# Patient Record
Sex: FEMALE | Race: BLACK OR AFRICAN AMERICAN | NOT HISPANIC OR LATINO
[De-identification: names, ages, dates, MRNs, and addresses within clinical notes are randomized per-mention and may not be internally consistent; named-entity substitution may affect disease eponyms.]

---

## 2019-11-27 PROBLEM — Z00.00 ENCOUNTER FOR PREVENTIVE HEALTH EXAMINATION: Status: ACTIVE | Noted: 2019-11-27

## 2019-11-29 ENCOUNTER — APPOINTMENT (OUTPATIENT)
Dept: PAIN MANAGEMENT | Facility: CLINIC | Age: 44
End: 2019-11-29
Payer: COMMERCIAL

## 2019-11-29 VITALS
WEIGHT: 158 LBS | BODY MASS INDEX: 26.33 KG/M2 | HEIGHT: 65 IN | DIASTOLIC BLOOD PRESSURE: 100 MMHG | SYSTOLIC BLOOD PRESSURE: 150 MMHG

## 2019-11-29 DIAGNOSIS — Z78.9 OTHER SPECIFIED HEALTH STATUS: ICD-10-CM

## 2019-11-29 PROCEDURE — 99204 OFFICE O/P NEW MOD 45 MIN: CPT

## 2019-11-29 NOTE — PHYSICAL EXAM
[de-identified] : Constitutional: Well-developed, in no acute distress\par Eyes: Pupil- Right: normal, Left: normal\par Neck exam: Inspection normal\par Respiratory: Normal effort, speaking in full sentences\par Cardiovascular: Regular rate and rhythm\par Vascular: Dorsal pedis pulses normal and equal bilaterally\par Abdomen: Inspection normal, no distension\par Skin: Inspection normal, no bruising noted\par Musculoskeletal:\par Lumbar Spine:   Gait: Antalgic\par 		Inspection: Normal curvature, no abnormal kyphosis or scoliosis\par 		Facet loading: pain bilaterally\par 		Palpation:\par 			Lumbar and paraspinal muscles: pain bilaterally\par 			Sacroiliac joint: no pain\par 			Greater trochanter: no pain\par 		Muscle Strength:\par 		Iliopsoas: 5/5 bilaterally\par 		Quadriceps: 5/5 bilaterally\par 		Hamstrings: 5/5 bilaterally\par 		Tibialis anterior: 5/5 bilaterally\par 		Extensor hallucis longus: 5/5 bilaterally\par \par 		Sensation: normal and equal in bilateral lower extremities\par \par 		Reflexes:\par 			Patellar reflex: 2+ bilaterally\par 			Ankle jerk reflex: 2+ bilaterally\par 		\par 		Straight leg raise: negative bilaterally\par \par Extremity: no edema noted\par Neurological: Memory normal, AAO x 3, Cranial nerves II - XII grossly normal\par Psychiatric: Appropriate mood and affect, oriented to time, place, person, and situation\par \par \par

## 2019-11-29 NOTE — HISTORY OF PRESENT ILLNESS
[Back Pain] : back pain [___ wks] : [unfilled] week(s) ago [7] : a maximum pain level of 7/10 [Sharp] : sharp [Aching] : aching [Throbbing] : throbbing [Shooting] : shooting [Bending] : bending [Lifting] : lifting [FreeTextEntry1] : 44 yof presents w/ low back pain radiating down her left lower extremity. Pain is severe and 10 out of 10 in intensity at its worst. She has had a history of similar pain on the right side but never on the left. Pain radiates down the posterior aspect of the leg. Pain is sharp and shooting. Flexing makes the pain worse. Quality of life is significantly impaired. No other recent changes in health. [FreeTextEntry3] : squatting  [FreeTextEntry4] : n/a

## 2019-11-29 NOTE — ASSESSMENT
[FreeTextEntry1] : 44 yof presents w/ low back pain radiating down her left lower extremity. Quality of life is significantly impaired. Given her failure to improve with all other conservative measures, and the severity of her radiculopathy, recommend MRI of the lumbar spine. Patient will return to review imaging and plan for potential intervention, likely epidural steroid injection. Continue HEP. NSAIDS prn.

## 2019-12-09 ENCOUNTER — RESULT REVIEW (OUTPATIENT)
Age: 44
End: 2019-12-09

## 2020-01-21 ENCOUNTER — APPOINTMENT (OUTPATIENT)
Dept: INTERNAL MEDICINE | Facility: CLINIC | Age: 45
End: 2020-01-21
Payer: COMMERCIAL

## 2020-01-21 VITALS
TEMPERATURE: 97.4 F | HEIGHT: 65 IN | SYSTOLIC BLOOD PRESSURE: 140 MMHG | BODY MASS INDEX: 26.99 KG/M2 | OXYGEN SATURATION: 100 % | HEART RATE: 72 BPM | WEIGHT: 162 LBS | DIASTOLIC BLOOD PRESSURE: 80 MMHG | RESPIRATION RATE: 16 BRPM

## 2020-01-21 VITALS — SYSTOLIC BLOOD PRESSURE: 160 MMHG | DIASTOLIC BLOOD PRESSURE: 86 MMHG

## 2020-01-21 DIAGNOSIS — Z87.39 PERSONAL HISTORY OF OTHER DISEASES OF THE MUSCULOSKELETAL SYSTEM AND CONNECTIVE TISSUE: ICD-10-CM

## 2020-01-21 DIAGNOSIS — M47.817 SPONDYLOSIS W/OUT MYELOPATHY OR RADICULOPATHY, LUMBOSACRAL REGION: ICD-10-CM

## 2020-01-21 DIAGNOSIS — Z80.3 FAMILY HISTORY OF MALIGNANT NEOPLASM OF BREAST: ICD-10-CM

## 2020-01-21 PROCEDURE — 99204 OFFICE O/P NEW MOD 45 MIN: CPT

## 2020-01-21 NOTE — HISTORY OF PRESENT ILLNESS
[FreeTextEntry8] : here to establish care, former px of [facility], \par off Amlodipine x few months after taking it for years and c/o occasional face/lip swelling\par ++ HA  x 1 week , constant, no  visual changes

## 2020-01-21 NOTE — REVIEW OF SYSTEMS
[Negative] : Heme/Lymph [Dizziness] : no dizziness [Headache] : headache [Memory Loss] : no memory loss [de-identified] : see HPI

## 2020-02-06 ENCOUNTER — APPOINTMENT (OUTPATIENT)
Dept: INTERNAL MEDICINE | Facility: CLINIC | Age: 45
End: 2020-02-06
Payer: COMMERCIAL

## 2020-02-06 VITALS
SYSTOLIC BLOOD PRESSURE: 130 MMHG | BODY MASS INDEX: 27.16 KG/M2 | TEMPERATURE: 98.8 F | WEIGHT: 163 LBS | RESPIRATION RATE: 16 BRPM | DIASTOLIC BLOOD PRESSURE: 82 MMHG | HEIGHT: 65 IN

## 2020-02-06 VITALS — SYSTOLIC BLOOD PRESSURE: 132 MMHG | HEART RATE: 60 BPM | DIASTOLIC BLOOD PRESSURE: 70 MMHG

## 2020-02-06 PROCEDURE — 99213 OFFICE O/P EST LOW 20 MIN: CPT

## 2020-04-26 ENCOUNTER — MESSAGE (OUTPATIENT)
Age: 45
End: 2020-04-26

## 2020-05-03 LAB
SARS-COV-2 IGG SERPL IA-ACNC: <0.1 INDEX
SARS-COV-2 IGG SERPL QL IA: NEGATIVE

## 2020-07-20 ENCOUNTER — APPOINTMENT (OUTPATIENT)
Dept: INTERNAL MEDICINE | Facility: CLINIC | Age: 45
End: 2020-07-20
Payer: COMMERCIAL

## 2020-07-20 ENCOUNTER — NON-APPOINTMENT (OUTPATIENT)
Age: 45
End: 2020-07-20

## 2020-07-20 VITALS
SYSTOLIC BLOOD PRESSURE: 128 MMHG | HEART RATE: 64 BPM | DIASTOLIC BLOOD PRESSURE: 80 MMHG | HEIGHT: 65 IN | BODY MASS INDEX: 28.49 KG/M2 | TEMPERATURE: 98.3 F | WEIGHT: 171 LBS

## 2020-07-20 DIAGNOSIS — Z76.89 PERSONS ENCOUNTERING HEALTH SERVICES IN OTHER SPECIFIED CIRCUMSTANCES: ICD-10-CM

## 2020-07-20 PROCEDURE — 36415 COLL VENOUS BLD VENIPUNCTURE: CPT

## 2020-07-20 PROCEDURE — 93000 ELECTROCARDIOGRAM COMPLETE: CPT

## 2020-07-20 PROCEDURE — 99396 PREV VISIT EST AGE 40-64: CPT | Mod: 25

## 2020-07-20 NOTE — HEALTH RISK ASSESSMENT
[Good] : ~his/her~ current health as good [Yes] : Yes [0] : 1) Little interest or pleasure doing things: Not at all (0) [Patient reported PAP Smear was normal] : Patient reported PAP Smear was normal [Patient reported mammogram was normal] : Patient reported mammogram was normal [HIV test declined] : HIV test declined [With Family] : lives with family [Employed] : employed [High School] : high school [] :  [# Of Children ___] : has [unfilled] children [With Patient/Caregiver] : With Patient/Caregiver [Name: ___] : Health Care Proxy's Name: [unfilled]  [Designated Healthcare Proxy] : Designated healthcare proxy [Relationship: ___] : Relationship: [unfilled] [] : No [[id number]] : 0 [MammogramDate] : 01/19 [PapSmearDate] : 01/19 [AdvancecareDate] : 07/20/20

## 2020-07-20 NOTE — REVIEW OF SYSTEMS
[Negative] : Neurological [Discharge] : no discharge [Vision Problems] : no vision problems [FreeTextEntry3] : severe miopia

## 2020-07-20 NOTE — PHYSICAL EXAM
[Normal] : normal gait, coordination grossly intact, no focal deficits [de-identified] : bilat dense  no lumps [de-identified] : enlarged thyroid [de-identified] : no rashes

## 2020-07-21 LAB
25(OH)D3 SERPL-MCNC: 32 NG/ML
ALBUMIN SERPL ELPH-MCNC: 4.6 G/DL
ALP BLD-CCNC: 63 U/L
ALT SERPL-CCNC: 11 U/L
ANION GAP SERPL CALC-SCNC: 14 MMOL/L
AST SERPL-CCNC: 18 U/L
BASOPHILS # BLD AUTO: 0.03 K/UL
BASOPHILS NFR BLD AUTO: 0.7 %
BILIRUB SERPL-MCNC: 0.2 MG/DL
BUN SERPL-MCNC: 15 MG/DL
CALCIUM SERPL-MCNC: 9.5 MG/DL
CHLORIDE SERPL-SCNC: 104 MMOL/L
CHOLEST SERPL-MCNC: 185 MG/DL
CHOLEST/HDLC SERPL: 2.6 RATIO
CO2 SERPL-SCNC: 26 MMOL/L
CREAT SERPL-MCNC: 1 MG/DL
EOSINOPHIL # BLD AUTO: 0.06 K/UL
EOSINOPHIL NFR BLD AUTO: 1.4 %
GLUCOSE SERPL-MCNC: 87 MG/DL
HBV SURFACE AB SER QL: REACTIVE
HCT VFR BLD CALC: 40.3 %
HDLC SERPL-MCNC: 72 MG/DL
HGB BLD-MCNC: 12.4 G/DL
IMM GRANULOCYTES NFR BLD AUTO: 0.2 %
LDLC SERPL CALC-MCNC: 99 MG/DL
LYMPHOCYTES # BLD AUTO: 1.92 K/UL
LYMPHOCYTES NFR BLD AUTO: 45.4 %
MAN DIFF?: NORMAL
MCHC RBC-ENTMCNC: 26.5 PG
MCHC RBC-ENTMCNC: 30.8 GM/DL
MCV RBC AUTO: 86.1 FL
MONOCYTES # BLD AUTO: 0.44 K/UL
MONOCYTES NFR BLD AUTO: 10.4 %
NEUTROPHILS # BLD AUTO: 1.77 K/UL
NEUTROPHILS NFR BLD AUTO: 41.9 %
PLATELET # BLD AUTO: 278 K/UL
POTASSIUM SERPL-SCNC: 4.5 MMOL/L
PROT SERPL-MCNC: 8.2 G/DL
RBC # BLD: 4.68 M/UL
RBC # FLD: 17 %
SODIUM SERPL-SCNC: 144 MMOL/L
T4 FREE SERPL-MCNC: 1.1 NG/DL
TRIGL SERPL-MCNC: 68 MG/DL
TSH SERPL-ACNC: 0.69 UIU/ML
WBC # FLD AUTO: 4.23 K/UL

## 2020-11-13 ENCOUNTER — RX RENEWAL (OUTPATIENT)
Age: 45
End: 2020-11-13

## 2020-12-01 ENCOUNTER — RESULT REVIEW (OUTPATIENT)
Age: 45
End: 2020-12-01

## 2020-12-07 ENCOUNTER — APPOINTMENT (OUTPATIENT)
Dept: INTERNAL MEDICINE | Facility: CLINIC | Age: 45
End: 2020-12-07
Payer: COMMERCIAL

## 2020-12-07 VITALS
HEART RATE: 67 BPM | DIASTOLIC BLOOD PRESSURE: 110 MMHG | WEIGHT: 171 LBS | TEMPERATURE: 97.9 F | HEIGHT: 65 IN | BODY MASS INDEX: 28.49 KG/M2 | OXYGEN SATURATION: 99 % | SYSTOLIC BLOOD PRESSURE: 174 MMHG

## 2020-12-07 VITALS — DIASTOLIC BLOOD PRESSURE: 100 MMHG | SYSTOLIC BLOOD PRESSURE: 172 MMHG

## 2020-12-07 PROCEDURE — 99072 ADDL SUPL MATRL&STAF TM PHE: CPT

## 2020-12-07 PROCEDURE — 99213 OFFICE O/P EST LOW 20 MIN: CPT

## 2020-12-07 NOTE — HISTORY OF PRESENT ILLNESS
[FreeTextEntry8] : here for medical eval, c/o few days  HA and CP , occasional epistaxis\par compliant to BP meds, ?? diet

## 2020-12-07 NOTE — REVIEW OF SYSTEMS
[Fever] : no fever [Chills] : no chills [Chest Pain] : chest pain [Palpitations] : no palpitations [Orthopnea] : no orthopnea [Dysuria] : no dysuria [Nocturia] : nocturia [Hematuria] : no hematuria [Negative] : Neurological

## 2020-12-08 ENCOUNTER — NON-APPOINTMENT (OUTPATIENT)
Age: 45
End: 2020-12-08

## 2020-12-10 ENCOUNTER — APPOINTMENT (OUTPATIENT)
Dept: INTERNAL MEDICINE | Facility: CLINIC | Age: 45
End: 2020-12-10
Payer: COMMERCIAL

## 2020-12-10 VITALS
TEMPERATURE: 97.4 F | HEART RATE: 84 BPM | WEIGHT: 171 LBS | BODY MASS INDEX: 28.49 KG/M2 | OXYGEN SATURATION: 95 % | SYSTOLIC BLOOD PRESSURE: 132 MMHG | DIASTOLIC BLOOD PRESSURE: 80 MMHG | HEIGHT: 65 IN

## 2020-12-10 PROCEDURE — 99072 ADDL SUPL MATRL&STAF TM PHE: CPT

## 2020-12-10 PROCEDURE — 99213 OFFICE O/P EST LOW 20 MIN: CPT

## 2020-12-10 NOTE — REVIEW OF SYSTEMS
[Fever] : no fever [Chills] : no chills [Fatigue] : no fatigue [Earache] : no earache [Nasal Discharge] : no nasal discharge [Postnasal Drip] : no postnasal drip [Shortness Of Breath] : shortness of breath [Wheezing] : no wheezing [Cough] : no cough [Dyspnea on Exertion] : dyspnea on exertion [Negative] : Neurological [FreeTextEntry4] : congestion and sinus pressure

## 2020-12-10 NOTE — HISTORY OF PRESENT ILLNESS
[de-identified] : c/o few days dyspnea , worse c exercise was exposed to  Covid + and asymptomatic (she was cleared for work when reported), no fever + congestion \par  BP better after adding diuretic

## 2020-12-10 NOTE — PHYSICAL EXAM
[Normal TMs] : both tympanic membranes were normal [Normal] : normal gait, coordination grossly intact, no focal deficits and deep tendon reflexes were 2+ and symmetric [de-identified] : some frontal sinus tenderness

## 2020-12-10 NOTE — PHYSICAL EXAM
[Normal TMs] : both tympanic membranes were normal [Normal] : normal gait, coordination grossly intact, no focal deficits and deep tendon reflexes were 2+ and symmetric [de-identified] : some frontal sinus tenderness

## 2020-12-10 NOTE — HISTORY OF PRESENT ILLNESS
[de-identified] : c/o few days dyspnea , worse c exercise was exposed to  Covid + and asymptomatic (she was cleared for work when reported), no fever + congestion \par  BP better after adding diuretic

## 2020-12-11 ENCOUNTER — RESULT REVIEW (OUTPATIENT)
Age: 45
End: 2020-12-11

## 2020-12-15 ENCOUNTER — RESULT REVIEW (OUTPATIENT)
Age: 45
End: 2020-12-15

## 2020-12-18 ENCOUNTER — RESULT REVIEW (OUTPATIENT)
Age: 45
End: 2020-12-18

## 2021-01-12 ENCOUNTER — APPOINTMENT (OUTPATIENT)
Dept: PAIN MANAGEMENT | Facility: CLINIC | Age: 46
End: 2021-01-12
Payer: COMMERCIAL

## 2021-01-12 VITALS
HEIGHT: 65 IN | WEIGHT: 170 LBS | DIASTOLIC BLOOD PRESSURE: 70 MMHG | BODY MASS INDEX: 28.32 KG/M2 | TEMPERATURE: 98 F | SYSTOLIC BLOOD PRESSURE: 130 MMHG

## 2021-01-12 PROCEDURE — 99214 OFFICE O/P EST MOD 30 MIN: CPT

## 2021-01-12 PROCEDURE — 99072 ADDL SUPL MATRL&STAF TM PHE: CPT

## 2021-01-12 NOTE — ASSESSMENT
[FreeTextEntry1] : 45 yof presents w/ low back pain radiating down her left lower extremity which is worsening. I have personally reviewed the patient's MRI in detail and discussed it with them which is significant for a disc herniation at L4-L5 and moderate to severe foraminal narrowing on the left at L5-S1. The patient has failed to have relief with medication management and physical therapy. Given the patients failure to improve with all other conservative measures, recommend L5-S1 interlaminar epidural steroid injection under fluoroscopic guidance. The patient will follow-up with me in my office two weeks following intervention. Start tizanidine 4 mg qhs prn. Patient advised that the administration of steroids will likely raise the blood pressure transiently, and to monitor this closely following any interventional procedure that involves steroids, given history of hypertension.\par \par

## 2021-01-12 NOTE — PHYSICAL EXAM
[de-identified] : Constitutional: Well-developed, in no acute distress\par Eyes: Pupil- Right: normal, Left: normal\par Neck exam: Inspection normal\par Respiratory: Normal effort, speaking in full sentences\par Cardiovascular: Regular rate and rhythm\par Vascular: Dorsal pedis pulses normal and equal bilaterally\par Abdomen: Inspection normal, no distension\par Skin: Inspection normal, no bruising noted\par Musculoskeletal:\par Lumbar Spine:   Gait: Antalgic\par 		Inspection: Normal curvature, no abnormal kyphosis or scoliosis\par 		Facet loading: pain bilaterally\par 		Palpation:\par 			Lumbar and paraspinal muscles: pain bilaterally\par 			Sacroiliac joint: no pain\par 			Greater trochanter: no pain\par 		Muscle Strength:\par 		Iliopsoas: 5/5 bilaterally\par 		Quadriceps: 5/5 bilaterally\par 		Hamstrings: 5/5 bilaterally\par 		Tibialis anterior: 5/5 bilaterally\par 		Extensor hallucis longus: 5/5 bilaterally\par \par 		Sensation: normal and equal in bilateral lower extremities\par \par 		Reflexes:\par 			Patellar reflex: 2+ bilaterally\par 			Ankle jerk reflex: 2+ bilaterally\par 		\par 		Straight leg raise: negative bilaterally\par \par Extremity: no edema noted\par Neurological: Memory normal, AAO x 3, Cranial nerves II - XII grossly normal\par Psychiatric: Appropriate mood and affect, oriented to time, place, person, and situation\par \par \par

## 2021-01-12 NOTE — DATA REVIEWED
[No studies available for review at this time.] : No studies available for review at this time. [FreeTextEntry1] :  12/09/19\par  MRI LUMBAR SPINE\par \par  There is minimal retrolisthesis of L5 on S1 (approximately 2 to 3 mm). Otherwise there is normal\par curvature to the lumbar lordosis. The vertebral bodies are normal height and configuration. There is\par mild loss of disc height and T2 signal within the L5/S1 disc space consistent with desiccation. The\par intervertebral disc spaces are within normal limits. The conus terminates at the L1 level and\par demonstrates no evidence of abnormal signal changes. The spinal canal is capacious.\par \par  Evaluation of the individual levels demonstrates at the L5/S1 level there is minimal retrolisthesis\par with a superimposed  mild diffuse disc bulge and a tiny posterior annular tear contacting the\par ventral thecal sac. There is moderate bilateral foraminal narrowing, left greater than right. There\par is moderate facet and ligamentous hypertrophy. There is a tiny amount of fluid seen in the facet\par joints. There is no evidence of spinal canal stenosis.\par \par  At the L4/L5 level there is a shallow left paracentral subarticular and foraminal broad-based disc\par protrusion flattening the left the ventral thecal sac and mildly compressing the left descending L5\par nerve root and in close proximity to the left foraminal L4 exiting nerve root. There is mild to\par moderate facet and ligamentous hypertrophy. There is mild right and moderate left foraminal\par narrowing. There is no evidence of spinal canal stenosis.\par \par  The remaining levels demonstrate no evidence of focal disc herniation or spinal canal stenosis.\par There is a 0.4 cm cyst is seen within the left neural foramen at L1/L2 with bilateral small 0.4 cm\par bilateral perineural cysts within the L2/L3 and 0.3 cm perineural cysts within the L3/L4\par \par  There are a few soft tissue nodules seen within the uterus 1 that is submucosal in location likely\par fibroids. If there is further clinical concern, ultrasound is recommended.\par \par \par  Impression:\par \par  Minimal retrolisthesis of L5 on S1\par \par  L5/S1 mild diffuse disc bulge and a tiny posterior annular tear. L5/S1 no evidence of spinal canal\par stenosis.\par \par  L4/L5  shallow left paracentral subarticular and foraminal broad-based disc protrusion mildly\par compressing the left descending L5 nerve root and in close proximity to the left foraminal L4\par exiting nerve root. L4/L5 no evidence of spinal canal stenosis.\par

## 2021-01-12 NOTE — HISTORY OF PRESENT ILLNESS
[9] : 3. What number best describes how, during the past week, pain has interfered with your general activity? 9/10 pain [Back Pain] : back pain [___ wks] : [unfilled] week(s) ago [7] : a maximum pain level of 7/10 [Sharp] : sharp [Aching] : aching [Throbbing] : throbbing [Shooting] : shooting [Bending] : bending [Lifting] : lifting [FreeTextEntry1] : As per my note 11/2019: "44 yof presents w/ low back pain radiating down her left lower extremity. Pain is severe and 10 out of 10 in intensity at its worst. She has had a history of similar pain on the right side but never on the left. Pain radiates down the posterior aspect of the leg. Pain is sharp and shooting. Flexing makes the pain worse. Quality of life is significantly impaired. No other recent changes in health."\par \par Pt returns for follow-up today, 01/12/21. Her pain is worsening. Pain is down the left leg. Right leg is wnl. There is subjective weakness. No relief w/ multiple courses of physical therapy including 6 weeks of physical therapy within the last three months. \par \par  [FreeTextEntry2] : 25 [FreeTextEntry3] : squatting  [FreeTextEntry4] : n/a

## 2021-03-15 ENCOUNTER — RX RENEWAL (OUTPATIENT)
Age: 46
End: 2021-03-15

## 2021-06-10 ENCOUNTER — APPOINTMENT (OUTPATIENT)
Dept: INTERNAL MEDICINE | Facility: CLINIC | Age: 46
End: 2021-06-10
Payer: COMMERCIAL

## 2021-06-10 ENCOUNTER — NON-APPOINTMENT (OUTPATIENT)
Age: 46
End: 2021-06-10

## 2021-06-10 VITALS
OXYGEN SATURATION: 99 % | HEART RATE: 69 BPM | DIASTOLIC BLOOD PRESSURE: 80 MMHG | WEIGHT: 170 LBS | BODY MASS INDEX: 28.32 KG/M2 | HEIGHT: 65 IN | TEMPERATURE: 97 F | SYSTOLIC BLOOD PRESSURE: 134 MMHG

## 2021-06-10 DIAGNOSIS — Z00.01 ENCOUNTER FOR GENERAL ADULT MEDICAL EXAMINATION WITH ABNORMAL FINDINGS: ICD-10-CM

## 2021-06-10 PROCEDURE — 99214 OFFICE O/P EST MOD 30 MIN: CPT

## 2021-06-10 NOTE — REVIEW OF SYSTEMS
[Muscle Pain] : muscle pain [Negative] : Integumentary [Fever] : no fever [Chills] : no chills [Fatigue] : no fatigue [Earache] : no earache [Nasal Discharge] : no nasal discharge [Postnasal Drip] : no postnasal drip [Shortness Of Breath] : no shortness of breath [Wheezing] : no wheezing [Cough] : no cough [Dyspnea on Exertion] : no dyspnea on exertion [Muscle Weakness] : no muscle weakness [FreeTextEntry9] : neck

## 2021-06-10 NOTE — PHYSICAL EXAM
[Normal] : normal gait, coordination grossly intact, no focal deficits [de-identified] : full ROM , nontender  ,spastic trapezius mm bilat [de-identified] : no rashes
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
## 2021-08-04 ENCOUNTER — RX RENEWAL (OUTPATIENT)
Age: 46
End: 2021-08-04

## 2021-09-13 ENCOUNTER — RESULT REVIEW (OUTPATIENT)
Age: 46
End: 2021-09-13

## 2021-09-20 ENCOUNTER — APPOINTMENT (OUTPATIENT)
Dept: INTERNAL MEDICINE | Facility: CLINIC | Age: 46
End: 2021-09-20
Payer: COMMERCIAL

## 2021-09-20 VITALS
HEIGHT: 65 IN | HEART RATE: 56 BPM | DIASTOLIC BLOOD PRESSURE: 80 MMHG | SYSTOLIC BLOOD PRESSURE: 122 MMHG | TEMPERATURE: 97.4 F | OXYGEN SATURATION: 99 % | BODY MASS INDEX: 28.66 KG/M2 | WEIGHT: 172 LBS

## 2021-09-20 PROCEDURE — 99213 OFFICE O/P EST LOW 20 MIN: CPT

## 2021-09-20 RX ORDER — CHLORHEXIDINE GLUCONATE, 0.12% ORAL RINSE 1.2 MG/ML
0.12 SOLUTION DENTAL
Qty: 473 | Refills: 0 | Status: DISCONTINUED | COMMUNITY
Start: 2021-02-25 | End: 2021-09-20

## 2021-09-20 RX ORDER — CYCLOBENZAPRINE HYDROCHLORIDE 10 MG/1
10 TABLET, FILM COATED ORAL
Qty: 10 | Refills: 0 | Status: DISCONTINUED | COMMUNITY
Start: 2021-06-08 | End: 2021-09-20

## 2021-09-20 NOTE — HEALTH RISK ASSESSMENT
[] : No [No] : No [0] : 2) Feeling down, depressed, or hopeless: Not at all (0) [PHQ-2 Negative - No further assessment needed] : PHQ-2 Negative - No further assessment needed [[id number]] : 0
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
## 2021-09-20 NOTE — REVIEW OF SYSTEMS
[Fever] : no fever [Chills] : no chills [Fatigue] : no fatigue [Earache] : no earache [Nasal Discharge] : no nasal discharge [Postnasal Drip] : no postnasal drip [Shortness Of Breath] : no shortness of breath [Wheezing] : no wheezing [Cough] : no cough [Dyspnea on Exertion] : no dyspnea on exertion [Muscle Weakness] : no muscle weakness [Muscle Pain] : muscle pain [Back Pain] : back pain [Negative] : Neurological [FreeTextEntry9] : neck

## 2021-09-20 NOTE — PHYSICAL EXAM
[Normal TMs] : both tympanic membranes were normal [No Joint Swelling] : no joint swelling [Grossly Normal Strength/Tone] : grossly normal strength/tone [Normal] : normal gait, coordination grossly intact, no focal deficits and deep tendon reflexes were 2+ and symmetric [de-identified] : L SI joint tenderness [de-identified] : neg straight leg raising bilat

## 2021-10-29 ENCOUNTER — RX RENEWAL (OUTPATIENT)
Age: 46
End: 2021-10-29

## 2021-12-03 ENCOUNTER — RESULT REVIEW (OUTPATIENT)
Age: 46
End: 2021-12-03

## 2021-12-22 ENCOUNTER — RESULT REVIEW (OUTPATIENT)
Age: 46
End: 2021-12-22

## 2021-12-23 DIAGNOSIS — N63.0 UNSPECIFIED LUMP IN UNSPECIFIED BREAST: ICD-10-CM

## 2021-12-31 ENCOUNTER — RESULT REVIEW (OUTPATIENT)
Age: 46
End: 2021-12-31

## 2022-01-10 ENCOUNTER — RESULT REVIEW (OUTPATIENT)
Age: 47
End: 2022-01-10

## 2022-01-10 ENCOUNTER — APPOINTMENT (OUTPATIENT)
Dept: INTERNAL MEDICINE | Facility: CLINIC | Age: 47
End: 2022-01-10
Payer: COMMERCIAL

## 2022-01-10 VITALS
HEART RATE: 60 BPM | SYSTOLIC BLOOD PRESSURE: 118 MMHG | HEIGHT: 65 IN | OXYGEN SATURATION: 99 % | DIASTOLIC BLOOD PRESSURE: 78 MMHG | BODY MASS INDEX: 29.16 KG/M2 | WEIGHT: 175 LBS | TEMPERATURE: 98.4 F

## 2022-01-10 PROCEDURE — 99396 PREV VISIT EST AGE 40-64: CPT | Mod: 25

## 2022-01-10 PROCEDURE — 36415 COLL VENOUS BLD VENIPUNCTURE: CPT

## 2022-01-10 RX ORDER — TIZANIDINE 4 MG/1
4 TABLET ORAL
Qty: 30 | Refills: 1 | Status: DISCONTINUED | COMMUNITY
Start: 2021-01-12 | End: 2022-01-10

## 2022-01-10 NOTE — PHYSICAL EXAM
[Normal] : no joint swelling, grossly normal strength/tone [de-identified] : not examined  had BX in AM [de-identified] : no sciatic irritiation sx

## 2022-01-10 NOTE — HEALTH RISK ASSESSMENT
[Good] : ~his/her~  mood as  good [Never] : Never [0] : 2) Feeling down, depressed, or hopeless: Not at all (0) [PHQ-2 Negative - No further assessment needed] : PHQ-2 Negative - No further assessment needed [Patient reported mammogram was abnormal] : Patient reported mammogram was abnormal [HIV test declined] : HIV test declined [Hepatitis C test offered] : Hepatitis C test offered [With Family] : lives with family [Employed] : employed [High School] : high school [] :  [# Of Children ___] : has [unfilled] children [[id number]] : 0 [MammogramDate] : 12/21 [MammogramComments] : R breast Ca++

## 2022-01-10 NOTE — HISTORY OF PRESENT ILLNESS
[de-identified] : here for annual exam doing well, except  ch. Low back pain  limiting her activities, \par  able to work

## 2022-01-11 LAB
25(OH)D3 SERPL-MCNC: 31.7 NG/ML
ALBUMIN SERPL ELPH-MCNC: 4.6 G/DL
ALP BLD-CCNC: 58 U/L
ALT SERPL-CCNC: 18 U/L
ANION GAP SERPL CALC-SCNC: 10 MMOL/L
APPEARANCE: CLEAR
AST SERPL-CCNC: 21 U/L
BASOPHILS # BLD AUTO: 0.03 K/UL
BASOPHILS NFR BLD AUTO: 0.6 %
BILIRUB SERPL-MCNC: 0.2 MG/DL
BILIRUBIN URINE: NEGATIVE
BLOOD URINE: NEGATIVE
BUN SERPL-MCNC: 17 MG/DL
CALCIUM SERPL-MCNC: 9.4 MG/DL
CHLORIDE SERPL-SCNC: 100 MMOL/L
CHOLEST SERPL-MCNC: 207 MG/DL
CO2 SERPL-SCNC: 30 MMOL/L
COLOR: YELLOW
CREAT SERPL-MCNC: 1.11 MG/DL
EOSINOPHIL # BLD AUTO: 0.06 K/UL
EOSINOPHIL NFR BLD AUTO: 1.3 %
GLUCOSE QUALITATIVE U: NEGATIVE
GLUCOSE SERPL-MCNC: 107 MG/DL
HCT VFR BLD CALC: 36.8 %
HDLC SERPL-MCNC: 63 MG/DL
HGB BLD-MCNC: 11.8 G/DL
IMM GRANULOCYTES NFR BLD AUTO: 0.2 %
KETONES URINE: NEGATIVE
LDLC SERPL CALC-MCNC: 124 MG/DL
LEUKOCYTE ESTERASE URINE: NEGATIVE
LYMPHOCYTES # BLD AUTO: 2.1 K/UL
LYMPHOCYTES NFR BLD AUTO: 45.4 %
MAN DIFF?: NORMAL
MCHC RBC-ENTMCNC: 27.4 PG
MCHC RBC-ENTMCNC: 32.1 GM/DL
MCV RBC AUTO: 85.4 FL
MONOCYTES # BLD AUTO: 0.38 K/UL
MONOCYTES NFR BLD AUTO: 8.2 %
NEUTROPHILS # BLD AUTO: 2.05 K/UL
NEUTROPHILS NFR BLD AUTO: 44.3 %
NITRITE URINE: NEGATIVE
NONHDLC SERPL-MCNC: 145 MG/DL
PH URINE: 7
PLATELET # BLD AUTO: 292 K/UL
POTASSIUM SERPL-SCNC: 3.2 MMOL/L
PROT SERPL-MCNC: 8.5 G/DL
PROTEIN URINE: NORMAL
RBC # BLD: 4.31 M/UL
RBC # FLD: 16.1 %
SODIUM SERPL-SCNC: 141 MMOL/L
SPECIFIC GRAVITY URINE: 1.03
T4 FREE SERPL-MCNC: 1.1 NG/DL
TRIGL SERPL-MCNC: 105 MG/DL
TSH SERPL-ACNC: 0.81 UIU/ML
UROBILINOGEN URINE: NORMAL
WBC # FLD AUTO: 4.63 K/UL

## 2022-01-18 ENCOUNTER — RX RENEWAL (OUTPATIENT)
Age: 47
End: 2022-01-18

## 2022-01-19 ENCOUNTER — RX RENEWAL (OUTPATIENT)
Age: 47
End: 2022-01-19

## 2022-01-24 ENCOUNTER — RESULT REVIEW (OUTPATIENT)
Age: 47
End: 2022-01-24

## 2022-02-23 ENCOUNTER — APPOINTMENT (OUTPATIENT)
Dept: INTERNAL MEDICINE | Facility: CLINIC | Age: 47
End: 2022-02-23
Payer: COMMERCIAL

## 2022-02-23 PROCEDURE — 36415 COLL VENOUS BLD VENIPUNCTURE: CPT

## 2022-02-24 LAB
ANION GAP SERPL CALC-SCNC: 13 MMOL/L
BUN SERPL-MCNC: 15 MG/DL
CALCIUM SERPL-MCNC: 10 MG/DL
CHLORIDE SERPL-SCNC: 101 MMOL/L
CO2 SERPL-SCNC: 28 MMOL/L
CREAT SERPL-MCNC: 1.29 MG/DL
GLUCOSE SERPL-MCNC: 83 MG/DL
POTASSIUM SERPL-SCNC: 4.1 MMOL/L
SODIUM SERPL-SCNC: 142 MMOL/L

## 2022-03-02 DIAGNOSIS — Z12.31 ENCOUNTER FOR SCREENING MAMMOGRAM FOR MALIGNANT NEOPLASM OF BREAST: ICD-10-CM

## 2022-03-02 DIAGNOSIS — Z01.419 ENCOUNTER FOR GYNECOLOGICAL EXAMINATION (GENERAL) (ROUTINE) W/OUT ABNORMAL FINDINGS: ICD-10-CM

## 2022-03-04 ENCOUNTER — APPOINTMENT (OUTPATIENT)
Dept: OBGYN | Facility: CLINIC | Age: 47
End: 2022-03-04
Payer: COMMERCIAL

## 2022-03-04 ENCOUNTER — NON-APPOINTMENT (OUTPATIENT)
Age: 47
End: 2022-03-04

## 2022-03-04 VITALS
WEIGHT: 178 LBS | SYSTOLIC BLOOD PRESSURE: 118 MMHG | HEIGHT: 65 IN | DIASTOLIC BLOOD PRESSURE: 76 MMHG | BODY MASS INDEX: 29.66 KG/M2

## 2022-03-04 PROCEDURE — 99386 PREV VISIT NEW AGE 40-64: CPT

## 2022-03-07 ENCOUNTER — TRANSCRIPTION ENCOUNTER (OUTPATIENT)
Age: 47
End: 2022-03-07

## 2022-03-07 LAB — HPV HIGH+LOW RISK DNA PNL CVX: NOT DETECTED

## 2022-03-17 LAB — CYTOLOGY CVX/VAG DOC THIN PREP: ABNORMAL

## 2022-04-05 ENCOUNTER — RX RENEWAL (OUTPATIENT)
Age: 47
End: 2022-04-05

## 2022-04-08 ENCOUNTER — APPOINTMENT (OUTPATIENT)
Dept: OBGYN | Facility: CLINIC | Age: 47
End: 2022-04-08
Payer: COMMERCIAL

## 2022-04-08 VITALS
DIASTOLIC BLOOD PRESSURE: 68 MMHG | HEIGHT: 65 IN | WEIGHT: 180 LBS | SYSTOLIC BLOOD PRESSURE: 110 MMHG | BODY MASS INDEX: 29.99 KG/M2

## 2022-04-08 DIAGNOSIS — R87.619 UNSPECIFIED ABNORMAL CYTOLOGICAL FINDINGS IN SPECIMENS FROM CERVIX UTERI: ICD-10-CM

## 2022-04-08 LAB
HCG UR QL: NEGATIVE
QUALITY CONTROL: YES

## 2022-04-08 PROCEDURE — 58100 BIOPSY OF UTERUS LINING: CPT

## 2022-04-08 PROCEDURE — 81025 URINE PREGNANCY TEST: CPT

## 2022-04-16 LAB — CORE LAB BIOPSY: NORMAL

## 2022-05-25 ENCOUNTER — APPOINTMENT (OUTPATIENT)
Dept: INTERNAL MEDICINE | Facility: CLINIC | Age: 47
End: 2022-05-25
Payer: COMMERCIAL

## 2022-05-25 VITALS
HEART RATE: 59 BPM | BODY MASS INDEX: 30.49 KG/M2 | TEMPERATURE: 97.6 F | HEIGHT: 65 IN | SYSTOLIC BLOOD PRESSURE: 112 MMHG | OXYGEN SATURATION: 99 % | WEIGHT: 183 LBS | DIASTOLIC BLOOD PRESSURE: 68 MMHG

## 2022-05-25 PROCEDURE — 99213 OFFICE O/P EST LOW 20 MIN: CPT

## 2022-05-25 NOTE — HISTORY OF PRESENT ILLNESS
[FreeTextEntry8] : Nii  comes in complaining of acute back pain last week.  Last Thursday without any heavy lifting or direct trauma, she had acute pain in the right buttock radiating to her right leg.  She took Advil since as needed and continue to work and she got better.  She still describes slight pain in the lower back on the right and tightness in her right leg but no swelling.  She had an MRI in 2019 showing L5-S1 slight disc herniation on the left side.This  time her symptoms are on the right side.

## 2022-05-25 NOTE — HEALTH RISK ASSESSMENT
[0] : 2) Feeling down, depressed, or hopeless: Not at all (0) [PHQ-2 Negative - No further assessment needed] : PHQ-2 Negative - No further assessment needed [[id number]] : 0

## 2022-05-25 NOTE — PHYSICAL EXAM
[Normal TMs] : both tympanic membranes were normal [No Edema] : there was no peripheral edema [No Joint Swelling] : no joint swelling [Grossly Normal Strength/Tone] : grossly normal strength/tone [No Focal Deficits] : no focal deficits [Normal Gait] : normal gait [Normal] : normal gait, coordination grossly intact, no focal deficits and deep tendon reflexes were 2+ and symmetric [de-identified] : full nontender ROM LE bilat [de-identified] : neg straight leg raising bilat

## 2022-06-27 ENCOUNTER — RX RENEWAL (OUTPATIENT)
Age: 47
End: 2022-06-27

## 2022-09-12 ENCOUNTER — RX RENEWAL (OUTPATIENT)
Age: 47
End: 2022-09-12

## 2022-11-28 ENCOUNTER — RX RENEWAL (OUTPATIENT)
Age: 47
End: 2022-11-28

## 2023-02-27 ENCOUNTER — RX RENEWAL (OUTPATIENT)
Age: 48
End: 2023-02-27

## 2023-08-30 ENCOUNTER — RX RENEWAL (OUTPATIENT)
Age: 48
End: 2023-08-30

## 2023-10-02 ENCOUNTER — APPOINTMENT (OUTPATIENT)
Dept: INTERNAL MEDICINE | Facility: CLINIC | Age: 48
End: 2023-10-02
Payer: COMMERCIAL

## 2023-10-02 VITALS
HEART RATE: 57 BPM | OXYGEN SATURATION: 100 % | DIASTOLIC BLOOD PRESSURE: 72 MMHG | WEIGHT: 185 LBS | TEMPERATURE: 98.1 F | BODY MASS INDEX: 30.82 KG/M2 | HEIGHT: 65 IN | SYSTOLIC BLOOD PRESSURE: 106 MMHG

## 2023-10-02 DIAGNOSIS — Z01.419 ENCOUNTER FOR GYNECOLOGICAL EXAMINATION (GENERAL) (ROUTINE) W/OUT ABNORMAL FINDINGS: ICD-10-CM

## 2023-10-02 DIAGNOSIS — Z11.3 ENCOUNTER FOR SCREENING FOR INFECTIONS WITH A PREDOMINANTLY SEXUAL MODE OF TRANSMISSION: ICD-10-CM

## 2023-10-02 DIAGNOSIS — Z00.01 ENCOUNTER FOR GENERAL ADULT MEDICAL EXAMINATION WITH ABNORMAL FINDINGS: ICD-10-CM

## 2023-10-02 DIAGNOSIS — G44.83 PRIMARY COUGH HEADACHE: ICD-10-CM

## 2023-10-02 DIAGNOSIS — I10 ESSENTIAL (PRIMARY) HYPERTENSION: ICD-10-CM

## 2023-10-02 DIAGNOSIS — M54.16 RADICULOPATHY, LUMBAR REGION: ICD-10-CM

## 2023-10-02 DIAGNOSIS — Z87.898 PERSONAL HISTORY OF OTHER SPECIFIED CONDITIONS: ICD-10-CM

## 2023-10-02 DIAGNOSIS — R92.2 INCONCLUSIVE MAMMOGRAM: ICD-10-CM

## 2023-10-02 DIAGNOSIS — Z87.39 PERSONAL HISTORY OF OTHER DISEASES OF THE MUSCULOSKELETAL SYSTEM AND CONNECTIVE TISSUE: ICD-10-CM

## 2023-10-02 DIAGNOSIS — R06.09 OTHER FORMS OF DYSPNEA: ICD-10-CM

## 2023-10-02 DIAGNOSIS — Z86.39 PERSONAL HISTORY OF OTHER ENDOCRINE, NUTRITIONAL AND METABOLIC DISEASE: ICD-10-CM
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
## 2023-10-02 DIAGNOSIS — E66.3 OVERWEIGHT: ICD-10-CM

## 2023-10-02 DIAGNOSIS — M47.817 SPONDYLOSIS W/OUT MYELOPATHY OR RADICULOPATHY, LUMBOSACRAL REGION: ICD-10-CM

## 2023-10-02 DIAGNOSIS — R92.30 INCONCLUSIVE MAMMOGRAM: ICD-10-CM

## 2023-10-02 PROCEDURE — 99396 PREV VISIT EST AGE 40-64: CPT

## 2023-10-02 RX ORDER — ZOSTER VACCINE RECOMBINANT, ADJUVANTED 50 MCG/0.5
50 KIT INTRAMUSCULAR
Qty: 2 | Refills: 0 | Status: ACTIVE | COMMUNITY
Start: 2023-10-02 | End: 1900-01-01

## 2023-10-02 RX ORDER — CELECOXIB 200 MG/1
200 CAPSULE ORAL
Qty: 30 | Refills: 0 | Status: DISCONTINUED | COMMUNITY
Start: 2022-05-25 | End: 2023-10-02

## 2023-10-02 RX ORDER — FLUTICASONE PROPIONATE 50 UG/1
50 SPRAY, METERED NASAL
Qty: 3 | Refills: 3 | Status: DISCONTINUED | COMMUNITY
Start: 2020-12-10 | End: 2023-10-02

## 2023-10-04 LAB
25(OH)D3 SERPL-MCNC: 19.1 NG/ML
ALBUMIN SERPL ELPH-MCNC: 4.6 G/DL
ALP BLD-CCNC: 63 U/L
ALT SERPL-CCNC: 14 U/L
ANION GAP SERPL CALC-SCNC: 10 MMOL/L
APPEARANCE: CLEAR
AST SERPL-CCNC: 20 U/L
BASOPHILS # BLD AUTO: 0.03 K/UL
BASOPHILS NFR BLD AUTO: 0.5 %
BILIRUB SERPL-MCNC: 0.2 MG/DL
BILIRUBIN URINE: NEGATIVE
BLOOD URINE: NEGATIVE
BUN SERPL-MCNC: 15 MG/DL
CALCIUM SERPL-MCNC: 10.1 MG/DL
CHLORIDE SERPL-SCNC: 100 MMOL/L
CHOLEST SERPL-MCNC: 202 MG/DL
CO2 SERPL-SCNC: 27 MMOL/L
COLOR: YELLOW
CREAT SERPL-MCNC: 1.04 MG/DL
EGFR: 65 ML/MIN/1.73M2
EOSINOPHIL # BLD AUTO: 0.06 K/UL
EOSINOPHIL NFR BLD AUTO: 1 %
GLUCOSE QUALITATIVE U: NEGATIVE MG/DL
GLUCOSE SERPL-MCNC: 89 MG/DL
HCT VFR BLD CALC: 35.2 %
HDLC SERPL-MCNC: 49 MG/DL
HGB BLD-MCNC: 11.4 G/DL
IMM GRANULOCYTES NFR BLD AUTO: 0.2 %
KETONES URINE: NEGATIVE MG/DL
LDLC SERPL CALC-MCNC: 122 MG/DL
LEUKOCYTE ESTERASE URINE: NEGATIVE
LYMPHOCYTES # BLD AUTO: 2.24 K/UL
LYMPHOCYTES NFR BLD AUTO: 38.4 %
MAGNESIUM SERPL-MCNC: 1.9 MG/DL
MAN DIFF?: NORMAL
MCHC RBC-ENTMCNC: 27.2 PG
MCHC RBC-ENTMCNC: 32.4 GM/DL
MCV RBC AUTO: 84 FL
MONOCYTES # BLD AUTO: 0.65 K/UL
MONOCYTES NFR BLD AUTO: 11.1 %
NEUTROPHILS # BLD AUTO: 2.84 K/UL
NEUTROPHILS NFR BLD AUTO: 48.8 %
NITRITE URINE: NEGATIVE
NONHDLC SERPL-MCNC: 153 MG/DL
PH URINE: 5.5
PLATELET # BLD AUTO: 259 K/UL
POTASSIUM SERPL-SCNC: 3.6 MMOL/L
PROT SERPL-MCNC: 8.3 G/DL
PROTEIN URINE: NEGATIVE MG/DL
RBC # BLD: 4.19 M/UL
RBC # FLD: 16.2 %
SODIUM SERPL-SCNC: 137 MMOL/L
SPECIFIC GRAVITY URINE: 1.02
TRIGL SERPL-MCNC: 172 MG/DL
TSH SERPL-ACNC: 0.9 UIU/ML
UROBILINOGEN URINE: 1 MG/DL
WBC # FLD AUTO: 5.83 K/UL

## 2023-10-06 ENCOUNTER — APPOINTMENT (OUTPATIENT)
Dept: INTERNAL MEDICINE | Facility: CLINIC | Age: 48
End: 2023-10-06
Payer: COMMERCIAL

## 2023-10-06 PROCEDURE — 90750 HZV VACC RECOMBINANT IM: CPT

## 2023-10-06 PROCEDURE — 90471 IMMUNIZATION ADMIN: CPT

## 2023-11-20 ENCOUNTER — RX RENEWAL (OUTPATIENT)
Age: 48
End: 2023-11-20

## 2023-11-30 ENCOUNTER — RESULT REVIEW (OUTPATIENT)
Age: 48
End: 2023-11-30

## 2023-12-08 ENCOUNTER — NON-APPOINTMENT (OUTPATIENT)
Age: 48
End: 2023-12-08

## 2023-12-08 ENCOUNTER — APPOINTMENT (OUTPATIENT)
Dept: GASTROENTEROLOGY | Facility: CLINIC | Age: 48
End: 2023-12-08
Payer: COMMERCIAL

## 2023-12-08 VITALS
DIASTOLIC BLOOD PRESSURE: 79 MMHG | WEIGHT: 184 LBS | OXYGEN SATURATION: 100 % | BODY MASS INDEX: 30.66 KG/M2 | HEIGHT: 65 IN | HEART RATE: 61 BPM | SYSTOLIC BLOOD PRESSURE: 133 MMHG

## 2023-12-08 DIAGNOSIS — Z12.11 ENCOUNTER FOR SCREENING FOR MALIGNANT NEOPLASM OF COLON: ICD-10-CM

## 2023-12-08 DIAGNOSIS — Z80.0 ENCOUNTER FOR SCREENING FOR MALIGNANT NEOPLASM OF COLON: ICD-10-CM

## 2023-12-08 PROCEDURE — 99204 OFFICE O/P NEW MOD 45 MIN: CPT

## 2023-12-08 RX ORDER — CHOLECALCIFEROL (VITAMIN D3) 25 MCG
25 MCG TABLET ORAL
Refills: 0 | Status: ACTIVE | COMMUNITY

## 2023-12-18 ENCOUNTER — APPOINTMENT (OUTPATIENT)
Dept: INTERNAL MEDICINE | Facility: CLINIC | Age: 48
End: 2023-12-18

## 2024-01-04 ENCOUNTER — APPOINTMENT (OUTPATIENT)
Dept: INTERNAL MEDICINE | Facility: CLINIC | Age: 49
End: 2024-01-04
Payer: COMMERCIAL

## 2024-01-04 ENCOUNTER — MED ADMIN CHARGE (OUTPATIENT)
Age: 49
End: 2024-01-04

## 2024-01-04 DIAGNOSIS — Z23 ENCOUNTER FOR IMMUNIZATION: ICD-10-CM

## 2024-01-04 PROCEDURE — 90471 IMMUNIZATION ADMIN: CPT

## 2024-01-04 PROCEDURE — 90750 HZV VACC RECOMBINANT IM: CPT

## 2024-02-08 ENCOUNTER — RESULT REVIEW (OUTPATIENT)
Age: 49
End: 2024-02-08

## 2024-02-09 ENCOUNTER — RESULT REVIEW (OUTPATIENT)
Age: 49
End: 2024-02-09

## 2024-02-09 ENCOUNTER — APPOINTMENT (OUTPATIENT)
Dept: GASTROENTEROLOGY | Facility: HOSPITAL | Age: 49
End: 2024-02-09

## 2024-02-10 ENCOUNTER — RX RENEWAL (OUTPATIENT)
Age: 49
End: 2024-02-10

## 2024-02-10 RX ORDER — ATENOLOL 25 MG/1
25 TABLET ORAL
Qty: 90 | Refills: 0 | Status: ACTIVE | COMMUNITY
Start: 2020-01-21 | End: 1900-01-01

## 2024-02-10 RX ORDER — CHLORTHALIDONE 25 MG/1
25 TABLET ORAL
Qty: 90 | Refills: 0 | Status: ACTIVE | COMMUNITY
Start: 2020-12-07 | End: 1900-01-01

## 2024-02-12 ENCOUNTER — RX RENEWAL (OUTPATIENT)
Age: 49
End: 2024-02-12

## 2024-02-12 RX ORDER — POTASSIUM CHLORIDE 1500 MG/1
20 TABLET, EXTENDED RELEASE ORAL
Qty: 36 | Refills: 0 | Status: ACTIVE | COMMUNITY
Start: 2022-01-11 | End: 1900-01-01

## 2024-10-09 ENCOUNTER — NON-APPOINTMENT (OUTPATIENT)
Age: 49
End: 2024-10-09

## 2024-10-09 ENCOUNTER — APPOINTMENT (OUTPATIENT)
Dept: INTERNAL MEDICINE | Facility: CLINIC | Age: 49
End: 2024-10-09
Payer: COMMERCIAL

## 2024-10-09 VITALS
TEMPERATURE: 98.1 F | SYSTOLIC BLOOD PRESSURE: 144 MMHG | HEIGHT: 65.51 IN | BODY MASS INDEX: 29.3 KG/M2 | WEIGHT: 178 LBS | RESPIRATION RATE: 16 BRPM | HEART RATE: 74 BPM | OXYGEN SATURATION: 97 % | DIASTOLIC BLOOD PRESSURE: 90 MMHG

## 2024-10-09 VITALS — DIASTOLIC BLOOD PRESSURE: 80 MMHG | SYSTOLIC BLOOD PRESSURE: 138 MMHG

## 2024-10-09 DIAGNOSIS — R87.619 UNSPECIFIED ABNORMAL CYTOLOGICAL FINDINGS IN SPECIMENS FROM CERVIX UTERI: ICD-10-CM

## 2024-10-09 DIAGNOSIS — E01.0 IODINE-DEFICIENCY RELATED DIFFUSE (ENDEMIC) GOITER: ICD-10-CM

## 2024-10-09 DIAGNOSIS — I10 ESSENTIAL (PRIMARY) HYPERTENSION: ICD-10-CM

## 2024-10-09 DIAGNOSIS — Z00.00 ENCOUNTER FOR GENERAL ADULT MEDICAL EXAMINATION W/OUT ABNORMAL FINDINGS: ICD-10-CM

## 2024-10-09 DIAGNOSIS — Z12.31 ENCOUNTER FOR SCREENING MAMMOGRAM FOR MALIGNANT NEOPLASM OF BREAST: ICD-10-CM

## 2024-10-09 PROBLEM — R92.8 ABNORMAL MAMMOGRAM: Status: ACTIVE | Noted: 2024-10-09

## 2024-10-09 PROCEDURE — 36415 COLL VENOUS BLD VENIPUNCTURE: CPT

## 2024-10-09 PROCEDURE — G0444 DEPRESSION SCREEN ANNUAL: CPT | Mod: 59

## 2024-10-09 PROCEDURE — 93000 ELECTROCARDIOGRAM COMPLETE: CPT | Mod: 59

## 2024-10-09 PROCEDURE — 99396 PREV VISIT EST AGE 40-64: CPT

## 2024-10-11 ENCOUNTER — NON-APPOINTMENT (OUTPATIENT)
Age: 49
End: 2024-10-11

## 2024-10-11 LAB
ALBUMIN SERPL ELPH-MCNC: 4.4 G/DL
ALP BLD-CCNC: 74 U/L
ALT SERPL-CCNC: 21 U/L
ANION GAP SERPL CALC-SCNC: 11 MMOL/L
AST SERPL-CCNC: 27 U/L
BILIRUB SERPL-MCNC: 0.2 MG/DL
BUN SERPL-MCNC: 12 MG/DL
CALCIUM SERPL-MCNC: 10.1 MG/DL
CHLORIDE SERPL-SCNC: 102 MMOL/L
CHOLEST SERPL-MCNC: 205 MG/DL
CO2 SERPL-SCNC: 30 MMOL/L
CREAT SERPL-MCNC: 1.02 MG/DL
EGFR: 67 ML/MIN/1.73M2
ESTIMATED AVERAGE GLUCOSE: 123 MG/DL
GLUCOSE SERPL-MCNC: 75 MG/DL
HBA1C MFR BLD HPLC: 5.9 %
HCT VFR BLD CALC: 35.4 %
HDLC SERPL-MCNC: 65 MG/DL
HGB BLD-MCNC: 11.5 G/DL
LDLC SERPL CALC-MCNC: 122 MG/DL
MCHC RBC-ENTMCNC: 27.3 PG
MCHC RBC-ENTMCNC: 32.5 GM/DL
MCV RBC AUTO: 84.1 FL
NONHDLC SERPL-MCNC: 140 MG/DL
PLATELET # BLD AUTO: 345 K/UL
POTASSIUM SERPL-SCNC: 4.1 MMOL/L
PROT SERPL-MCNC: 8.5 G/DL
RBC # BLD: 4.21 M/UL
RBC # FLD: 15.7 %
SODIUM SERPL-SCNC: 143 MMOL/L
TRIGL SERPL-MCNC: 101 MG/DL
TSH SERPL-ACNC: 0.84 UIU/ML
WBC # FLD AUTO: 4.56 K/UL

## 2024-10-15 DIAGNOSIS — R92.8 OTHER ABNORMAL AND INCONCLUSIVE FINDINGS ON DIAGNOSTIC IMAGING OF BREAST: ICD-10-CM

## 2024-12-03 ENCOUNTER — RESULT REVIEW (OUTPATIENT)
Age: 49
End: 2024-12-03

## 2024-12-03 ENCOUNTER — TRANSCRIPTION ENCOUNTER (OUTPATIENT)
Age: 49
End: 2024-12-03

## 2025-02-19 ENCOUNTER — NON-APPOINTMENT (OUTPATIENT)
Age: 50
End: 2025-02-19

## 2025-02-19 ENCOUNTER — APPOINTMENT (OUTPATIENT)
Age: 50
End: 2025-02-19
Payer: OTHER GOVERNMENT

## 2025-02-19 VITALS
BODY MASS INDEX: 28.99 KG/M2 | WEIGHT: 174 LBS | HEIGHT: 65 IN | SYSTOLIC BLOOD PRESSURE: 130 MMHG | DIASTOLIC BLOOD PRESSURE: 85 MMHG

## 2025-02-19 DIAGNOSIS — Z01.419 ENCOUNTER FOR GYNECOLOGICAL EXAMINATION (GENERAL) (ROUTINE) W/OUT ABNORMAL FINDINGS: ICD-10-CM

## 2025-02-19 PROCEDURE — 99396 PREV VISIT EST AGE 40-64: CPT

## 2025-02-19 PROCEDURE — 99459 PELVIC EXAMINATION: CPT

## 2025-02-25 LAB
CYTOLOGY CVX/VAG DOC THIN PREP: NORMAL
HPV HIGH+LOW RISK DNA PNL CVX: NOT DETECTED